# Patient Record
Sex: MALE | Race: WHITE
[De-identification: names, ages, dates, MRNs, and addresses within clinical notes are randomized per-mention and may not be internally consistent; named-entity substitution may affect disease eponyms.]

---

## 2022-01-28 ENCOUNTER — HOSPITAL ENCOUNTER (EMERGENCY)
Dept: HOSPITAL 12 - ER | Age: 42
Discharge: LEFT BEFORE DISCHARGE | End: 2022-01-28
Payer: SELF-PAY

## 2022-01-28 VITALS — HEIGHT: 68 IN | WEIGHT: 175 LBS | BODY MASS INDEX: 26.52 KG/M2
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
## 2022-01-28 DIAGNOSIS — S05.32XA: Primary | ICD-10-CM
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
## 2022-01-28 DIAGNOSIS — H21.02: ICD-10-CM
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
## 2022-01-28 PROCEDURE — A4663 DIALYSIS BLOOD PRESSURE CUFF: HCPCS

## 2022-01-28 NOTE — NUR
at bedside speaking with the patient about plan of care/plan to transfer 
for higher level of care. Jer (ER admitting) translating for the patient 
(Polish).

## 2022-01-28 NOTE — NUR
Called [phone] as requested by  for higher level of care 
transfer. Spoke with [facility] who stated they are at full capacity for all 
services and do not have any beds available at this time.

## 2022-01-28 NOTE — NUR
After lengthy conversation (with ) about risks and benefits 
of leaving AMA to go to higher level of care (Opthamology) patient consented 
and signed AMA form and is going directly to [facility] ER with his  that 
brought him here.  He states he understands (using ) that he 
must go straight to [facility], stay NPO and protect eye by not touching/rubbing eye. 
He also states he understands the risk of leaving

## 2022-01-28 NOTE — NUR
Patient does not wish to proceed with medical care recommended by .  
Patient given information related to possible complications, up to and 
including death, which could occur as a result of leaving the hospital at this 
time. Patient verbalizes understanding of risks involved due to leaving against 
medical advice. Patient has signed AMA form. Translation was done by Jer 
from ER admitting.